# Patient Record
Sex: MALE | URBAN - METROPOLITAN AREA
[De-identification: names, ages, dates, MRNs, and addresses within clinical notes are randomized per-mention and may not be internally consistent; named-entity substitution may affect disease eponyms.]

---

## 2024-04-03 ENCOUNTER — APPOINTMENT (RX ONLY)
Dept: URBAN - METROPOLITAN AREA CLINIC 179 | Facility: CLINIC | Age: 39
Setting detail: DERMATOLOGY
End: 2024-04-03

## 2024-04-03 DIAGNOSIS — B07.0 PLANTAR WART: ICD-10-CM | Status: WORSENING

## 2024-04-03 PROCEDURE — 99204 OFFICE O/P NEW MOD 45 MIN: CPT

## 2024-04-03 PROCEDURE — ? PRESCRIPTION MEDICATION MANAGEMENT

## 2024-04-03 PROCEDURE — ? PRESCRIPTION

## 2024-04-03 PROCEDURE — ? COUNSELING

## 2024-04-03 ASSESSMENT — LOCATION ZONE DERM: LOCATION ZONE: FEET

## 2024-04-03 ASSESSMENT — LOCATION SIMPLE DESCRIPTION DERM: LOCATION SIMPLE: LEFT PLANTAR SURFACE

## 2024-04-03 ASSESSMENT — LOCATION DETAILED DESCRIPTION DERM: LOCATION DETAILED: LEFT PLANTAR FOREFOOT OVERLYING 5TH METATARSAL

## 2024-04-03 NOTE — PROCEDURE: PRESCRIPTION MEDICATION MANAGEMENT
Initiate Treatment: Fluorouracil/SA paste (5% fluorouracil 15g in 70% salicylic acid)\\nSig: Apply to left foot daily till wart is gone and file wart down once weekly \\n\\nOTC urea cream daily
Detail Level: Zone
Render In Strict Bullet Format?: No